# Patient Record
Sex: MALE | Race: WHITE | NOT HISPANIC OR LATINO | Employment: OTHER | URBAN - NONMETROPOLITAN AREA
[De-identification: names, ages, dates, MRNs, and addresses within clinical notes are randomized per-mention and may not be internally consistent; named-entity substitution may affect disease eponyms.]

---

## 2023-10-30 ENCOUNTER — APPOINTMENT (OUTPATIENT)
Dept: CARDIOLOGY | Facility: CLINIC | Age: 78
End: 2023-10-30
Payer: MEDICARE

## 2023-11-10 LAB
NON-UH HIE ALANINE AMINOTRANSFERASE: 22 U/L (ref 7–52)
NON-UH HIE ALBUMIN LEVEL: 4.3 G/DL (ref 3.5–5.7)
NON-UH HIE ALBUMIN/GLOBULIN RATIO: 1.7
NON-UH HIE ALKALINE PHOSPHATASE: 49 U/L (ref 34–104)
NON-UH HIE ANION GAP: 8.4 (ref 6–15)
NON-UH HIE ASPARTATE AMINO TRANSFERASE: 25 U/L (ref 13–39)
NON-UH HIE BASOPHILS # (AUTO): 0.1 10*3/UL (ref 0–0.2)
NON-UH HIE BASOPHILS % (AUTO): 1.2 %
NON-UH HIE BILIRUBIN,TOTAL: 0.8 MG/DL (ref 0.3–1)
NON-UH HIE BLOOD UREA NITROGEN: 22 MG/DL (ref 7–25)
NON-UH HIE CALCIUM: 9.4 MG/DL (ref 8.6–10.3)
NON-UH HIE CARBON DIOXIDE: 31.1 MMOL/L (ref 21–31)
NON-UH HIE CHLORIDE: 106 MMOL/L (ref 98–107)
NON-UH HIE CHOL/HDL RATIO: 2.9
NON-UH HIE CHOLESTEROL: 121 MG/DL (ref 140–200)
NON-UH HIE CREATININE: 1.22 MG/DL (ref 0.7–1.3)
NON-UH HIE EOSINOPHILS # (AUTO): 0.2 10*3/UL (ref 0–0.45)
NON-UH HIE EOSINOPHILS % (AUTO): 3.3 %
NON-UH HIE ESTIMATED GFR: > 60
NON-UH HIE GLOBULIN: 2.5 G/DL
NON-UH HIE GLUCOSE: 87 MG/DL (ref 70–100)
NON-UH HIE HDL CHOLESTEROL: 42 MG/DL (ref 23–92)
NON-UH HIE HEMATOCRIT: 37.4 % (ref 38.8–50)
NON-UH HIE HEMOGLOBIN: 12.6 G/DL (ref 13–17)
NON-UH HIE LDL CHOLESTEROL,CALCULATED: 67 MG/DL (ref 0–100)
NON-UH HIE LYMPHOCYTES # (AUTO): 2.3 10*3/UL (ref 1–4.8)
NON-UH HIE LYMPHOCYTES % (AUTO): 40.1 %
NON-UH HIE MEAN CORPUSCULAR HEMOGLOBIN: 31.5 PG (ref 27.5–35.2)
NON-UH HIE MEAN CORPUSCULAR HGB CONC: 33.6 G/DL (ref 32.5–35.6)
NON-UH HIE MEAN CORPUSCULAR VOLUME: 93.6 FL (ref 83.5–101)
NON-UH HIE MEAN PLATELET VOLUME: 8.4 FL (ref 6.6–10.1)
NON-UH HIE MONOCYTES # (AUTO): 0.5 10*3/UL (ref 0–0.8)
NON-UH HIE MONOCYTES % (AUTO): 8.1 %
NON-UH HIE NEUTROPHILS # (AUTO): 2.7 10*3/UL (ref 1.8–7.7)
NON-UH HIE NEUTROPHILS % (AUTO): 47.3 %
NON-UH HIE NRBC%: 0.2 /100{WBC} (ref 0–0.5)
NON-UH HIE PLATELET COUNT: 140 10*3/UL (ref 150–450)
NON-UH HIE POTASSIUM: 4.5 MMOL/L (ref 3.5–5.1)
NON-UH HIE RED BLOOD COUNT: 3.99 (ref 3.9–5.6)
NON-UH HIE RED CELL DISTRIBUTION WIDTH: 14.8 % (ref 12–14.8)
NON-UH HIE SODIUM: 141 MMOL/L (ref 136–145)
NON-UH HIE TOTAL PROTEIN: 6.8 G/DL (ref 6.4–8.9)
NON-UH HIE TRIGLYCERIDE W/REFLEX: 62 MG/DL (ref 0–149)
NON-UH HIE UNCORRECTED WBC: 5.7 10*3/UL (ref 4.1–10.5)
NON-UH HIE VLDL CHOLESTEROL: 12 MG/DL
NON-UH HIE WHITE BLOOD COUNT: 5.7 10*3/UL (ref 4.1–10.5)

## 2023-11-15 PROBLEM — C67.9 BLADDER CANCER (MULTI): Status: ACTIVE | Noted: 2023-11-15

## 2023-11-15 PROBLEM — Z86.79 ATRIAL FIBRILLATION, CURRENTLY IN SINUS RHYTHM: Status: ACTIVE | Noted: 2023-11-15

## 2023-11-15 PROBLEM — N18.31 STAGE 3A CHRONIC KIDNEY DISEASE (MULTI): Status: ACTIVE | Noted: 2023-11-15

## 2023-11-15 PROBLEM — E78.5 HYPERLIPIDEMIA: Status: ACTIVE | Noted: 2023-11-15

## 2023-11-15 RX ORDER — FERROUS SULFATE 325(65) MG
1 TABLET ORAL 2 TIMES DAILY
COMMUNITY

## 2023-11-15 RX ORDER — GABAPENTIN 100 MG/1
2 CAPSULE ORAL 2 TIMES DAILY
COMMUNITY

## 2023-11-15 RX ORDER — ATORVASTATIN CALCIUM 10 MG/1
1 TABLET, FILM COATED ORAL NIGHTLY
COMMUNITY

## 2023-11-15 RX ORDER — METOPROLOL TARTRATE 25 MG/1
1 TABLET, FILM COATED ORAL 2 TIMES DAILY
COMMUNITY

## 2023-11-15 RX ORDER — VITAMIN B COMPLEX
1 CAPSULE ORAL DAILY
COMMUNITY

## 2023-11-16 ENCOUNTER — OFFICE VISIT (OUTPATIENT)
Dept: CARDIOLOGY | Facility: CLINIC | Age: 78
End: 2023-11-16
Payer: MEDICARE

## 2023-11-16 VITALS
BODY MASS INDEX: 25.34 KG/M2 | DIASTOLIC BLOOD PRESSURE: 64 MMHG | HEIGHT: 71 IN | HEART RATE: 60 BPM | WEIGHT: 181 LBS | SYSTOLIC BLOOD PRESSURE: 124 MMHG

## 2023-11-16 DIAGNOSIS — Z86.79 ATRIAL FIBRILLATION, CURRENTLY IN SINUS RHYTHM: ICD-10-CM

## 2023-11-16 DIAGNOSIS — E78.2 MIXED HYPERLIPIDEMIA: ICD-10-CM

## 2023-11-16 DIAGNOSIS — E66.3 OVERWEIGHT (BMI 25.0-29.9): ICD-10-CM

## 2023-11-16 DIAGNOSIS — N18.31 STAGE 3A CHRONIC KIDNEY DISEASE (MULTI): ICD-10-CM

## 2023-11-16 PROCEDURE — 1036F TOBACCO NON-USER: CPT | Performed by: INTERNAL MEDICINE

## 2023-11-16 PROCEDURE — 1159F MED LIST DOCD IN RCRD: CPT | Performed by: INTERNAL MEDICINE

## 2023-11-16 PROCEDURE — 99213 OFFICE O/P EST LOW 20 MIN: CPT | Performed by: INTERNAL MEDICINE

## 2023-11-16 PROCEDURE — 1160F RVW MEDS BY RX/DR IN RCRD: CPT | Performed by: INTERNAL MEDICINE

## 2023-11-16 RX ORDER — CALCIUM CARBONATE 300MG(750)
500 TABLET,CHEWABLE ORAL DAILY
COMMUNITY

## 2023-11-16 RX ORDER — MV-MIN/FA/VIT K/LUTEIN/ZEAXANT 200MCG-5MG
1 CAPSULE ORAL 2 TIMES DAILY
COMMUNITY

## 2023-11-16 NOTE — PROGRESS NOTES
Patient presents for 1 year follow-up.    Subjective :     Interval review of systems is negative for chest discomfort pressure tightness heaviness palpitations lightheadedness orthopnea paroxysmal nocturnal dyspnea dependent edema or claudication TIA or CVA type symptoms or bleeding diathesis.      History so Far :    1. history of atrial fibrillation currently in sinus rhythm  2. Echocardiogram June 2020 LVEF 60 to 65% trace aortic and mitral regurgitation trace tricuspid regurgitation mild pulmonary hypertension compared to an echo from 2016 RV systolic pressure has improved, left atrial diameter 3.3 cm left atrial volume index 20 mL/mÂ² RV systolic pressure 32 mmHg aortic root 3.2 cm which is upper normal  3. CKD stage IIIa, resolved recent GFR is greater than 60     Objective      Wt Readings from Last 3 Encounters:   11/16/23 82.1 kg (181 lb)   10/24/22 84.8 kg (187 lb)   02/08/22 91.6 kg (202 lb)            Physical Exam:    GENERAL APPEARANCE: in no acute distress.  CHEST: Symmetric and non-tender.  INTEGUMENT: Skin warm and dry  HEENT: No gross abnormalities identified.No pallor or scleral icterus.  NECK: Supple, no JVD, no bruit.   NEURO/PSHCY: Alert and oriented x3; appropriate behavior and responses and responses  LUNGS: Clear to auscultation bilaterally; normal respiratory effort.  HEART: Rate and rhythm regular with no evident murmur; no gallop appreciated.   ABDOMEN: Soft, non tender.  MUSCULOSKELETAL: No gross deformities.  EXTREMITIES: Warm  There is no edema noted.    Meds:  Current Outpatient Medications   Medication Instructions    atorvastatin (Lipitor) 10 mg tablet 1 tablet, oral, Nightly    b complex vitamins (Vitamins B Complex) capsule 1 capsule, oral, Daily    ferrous sulfate, 325 mg ferrous sulfate, tablet 1 tablet, oral, 2 times daily    gabapentin (Neurontin) 100 mg capsule 2 capsules, oral, 2 times daily    magnesium oxide (MAG-OX) 500 mg, Daily    metoprolol tartrate (Lopressor) 25 mg  tablet 1 tablet, oral, 2 times daily    mv-min-FA-vit K-lutein-zeaxant (PreserVision AREDS 2 Plus MV) 200 mcg-15 mcg- 5 mg-1 mg capsule 1 capsule, oral, 2 times daily          No Known Allergies          LABS:      Reviewed laboratory data from November 2023, GFR is now greater than 60 sodium 141 potassium 4.5 hemoglobin 12.6 hematocrit 37.4 lipid profile is excellent total cholesterol 121 HDL 42 LDL 67    Problem List:    Patient Active Problem List    Diagnosis Date Noted    Atrial fibrillation, currently in sinus rhythm 11/15/2023    Bladder cancer (CMS/Formerly [facility] [place]) 11/15/2023    Hyperlipidemia 11/15/2023    Stage 3a chronic kidney disease (CMS/Formerly [facility] [place]) 11/15/2023                 Assessment:    1.  Essential hypertension-blood pressure is at target  2.  Hyperlipidemia-on statin therapy, at target  3.  History of kidney disease stage IIIa and prior history of bladder cancer-GFR is now greater than 60  4.  Patient is refusing to go on an aspirin, has history of atrial fibrillation with no recurrence.    Patient is following a heart healthy lifestyle.      Follow up : 1 year      Kiah Mendez MD

## 2023-11-16 NOTE — PATIENT INSTRUCTIONS
Please bring all medicines, vitamins, and herbal supplements with you when you come to the office.    Prescriptions will not be filled unless you are compliant with your follow up appointments or have a follow up appointment scheduled as per instruction of your physician. Refills should be requested at the time of your visit.    Refuses ASA

## 2024-11-09 LAB
NON-UH HIE ALANINE AMINOTRANSFERASE: 17 U/L (ref 7–52)
NON-UH HIE ALBUMIN LEVEL: 4.1 G/DL (ref 3.5–5.7)
NON-UH HIE ALBUMIN/GLOBULIN RATIO: 1.6
NON-UH HIE ALKALINE PHOSPHATASE: 47 U/L (ref 34–104)
NON-UH HIE ANION GAP: 9.1 (ref 6–15)
NON-UH HIE ASPARTATE AMINO TRANSFERASE: 24 U/L (ref 13–39)
NON-UH HIE BILIRUBIN,TOTAL: 1.3 MG/DL (ref 0.3–1)
NON-UH HIE BLOOD UREA NITROGEN: 29 MG/DL (ref 7–25)
NON-UH HIE CALCIUM: 9 MG/DL (ref 8.6–10.3)
NON-UH HIE CARBON DIOXIDE: 27.8 MMOL/L (ref 21–31)
NON-UH HIE CHLORIDE: 101 MMOL/L (ref 98–107)
NON-UH HIE CHOL/HDL RATIO: 3.6
NON-UH HIE CHOLESTEROL: 126 MG/DL (ref 140–200)
NON-UH HIE CREATININE: 1.48 MG/DL (ref 0.7–1.3)
NON-UH HIE ESTIMATED GFR: 47.83
NON-UH HIE GLOBULIN: 2.6 G/DL
NON-UH HIE GLUCOSE: 94 MG/DL (ref 70–100)
NON-UH HIE HDL CHOLESTEROL: 35 MG/DL (ref 23–92)
NON-UH HIE LDL CHOLESTEROL,CALCULATED: 78 MG/DL (ref 0–100)
NON-UH HIE POTASSIUM: 4.9 MMOL/L (ref 3.5–5.1)
NON-UH HIE SODIUM: 133 MMOL/L (ref 136–145)
NON-UH HIE TOTAL PROTEIN: 6.7 G/DL (ref 6.4–8.9)
NON-UH HIE TRIGLYCERIDE W/REFLEX: 65 MG/DL (ref 0–149)
NON-UH HIE VLDL CHOLESTEROL: 13 MG/DL

## 2024-11-14 ENCOUNTER — APPOINTMENT (OUTPATIENT)
Dept: CARDIOLOGY | Facility: CLINIC | Age: 79
End: 2024-11-14
Payer: MEDICARE

## 2024-11-14 VITALS
WEIGHT: 191 LBS | HEIGHT: 71 IN | BODY MASS INDEX: 26.74 KG/M2 | HEART RATE: 60 BPM | DIASTOLIC BLOOD PRESSURE: 72 MMHG | SYSTOLIC BLOOD PRESSURE: 124 MMHG

## 2024-11-14 DIAGNOSIS — E78.2 MIXED HYPERLIPIDEMIA: ICD-10-CM

## 2024-11-14 DIAGNOSIS — Z87.891 FORMER SMOKER: ICD-10-CM

## 2024-11-14 DIAGNOSIS — E66.3 OVERWEIGHT (BMI 25.0-29.9): ICD-10-CM

## 2024-11-14 DIAGNOSIS — N18.31 STAGE 3A CHRONIC KIDNEY DISEASE (MULTI): ICD-10-CM

## 2024-11-14 DIAGNOSIS — Z86.79 ATRIAL FIBRILLATION, CURRENTLY IN SINUS RHYTHM: ICD-10-CM

## 2024-11-14 PROCEDURE — 1160F RVW MEDS BY RX/DR IN RCRD: CPT | Performed by: INTERNAL MEDICINE

## 2024-11-14 PROCEDURE — 1159F MED LIST DOCD IN RCRD: CPT | Performed by: INTERNAL MEDICINE

## 2024-11-14 PROCEDURE — G2211 COMPLEX E/M VISIT ADD ON: HCPCS | Performed by: INTERNAL MEDICINE

## 2024-11-14 PROCEDURE — 99214 OFFICE O/P EST MOD 30 MIN: CPT | Performed by: INTERNAL MEDICINE

## 2024-11-14 RX ORDER — ATORVASTATIN CALCIUM 10 MG/1
10 TABLET, FILM COATED ORAL NIGHTLY
Qty: 90 TABLET | Refills: 3 | Status: SHIPPED | OUTPATIENT
Start: 2024-11-14 | End: 2025-11-14

## 2024-11-14 RX ORDER — NAPROXEN SODIUM 220 MG/1
81 TABLET, FILM COATED ORAL DAILY
Start: 2024-11-14 | End: 2025-11-14

## 2024-11-14 RX ORDER — METOPROLOL TARTRATE 25 MG/1
25 TABLET, FILM COATED ORAL 2 TIMES DAILY
Qty: 180 TABLET | Refills: 3 | Status: SHIPPED | OUTPATIENT
Start: 2024-11-14 | End: 2025-11-14

## 2024-11-14 NOTE — LETTER
"November 15, 2024     Sameer Dougherty MD  [address]  [facility] [address]    Patient: Luther Yung   YOB: 1945   Date of Visit: 11/14/2024       Dear Dr. Sameer Dougherty MD:    Thank you for referring Luther Yung to me for evaluation. Below are my notes for this consultation.  If you have questions, please do not hesitate to call me. I look forward to following your patient along with you.       Sincerely,     Kiah Mendez MD      CC: No Recipients  ______________________________________________________________________________________    1 year follow-up.    Subjective :   Extremely pleasant 79-year-old with remote history of atrial fibrillation and chronic kidney disease presents for follow-up.  Interval review of systems is negative for chest discomfort pressure tightness heaviness palpitations lightheadedness orthopnea paroxysmal nocturnal dyspnea dependent edema or claudication TIA or CVA type symptoms or bleeding diathesis  Works out regularly.  Very active in [employer].  No falls.  History so Far :  1. history of atrial fibrillation currently in sinus rhythm  2. Echocardiogram June 2020 LVEF 60 to 65% trace aortic and mitral regurgitation trace tricuspid regurgitation mild pulmonary hypertension compared to an echo from 2016 RV systolic pressure has improved, left atrial diameter 3.3 cm left atrial volume index 20 mL/mÂ² RV systolic pressure 32 mmHg aortic root 3.2 cm which is upper normal  3. CKD stage IIIa, resolved recent GFR is greater than 60        4.  Mixed hyperlipidemia, on statin therapy     Objective   Wt Readings from Last 3 Encounters:   11/14/24 86.6 kg (191 lb)   11/16/23 82.1 kg (181 lb)   10/24/22 84.8 kg (187 lb)            Vitals:    11/14/24 0947   BP: 124/72   BP Location: Left arm   Patient Position: Sitting   Pulse: 60   Weight: 86.6 kg (191 lb)   Height: 1.803 m (5' 11\")                Physical Exam:    GENERAL APPEARANCE: in no acute distress.  CHEST: Symmetric " and non-tender.  INTEGUMENT: Skin warm and dry  HEENT: No gross abnormalities identified.No pallor or scleral icterus.  NECK: Supple, no JVD, no bruit.   NEURO/PSHCY: Alert and oriented x3; appropriate behavior and responses and responses  LUNGS: Clear to auscultation bilaterally; normal respiratory effort.  HEART: Rate and rhythm regular with no evident murmur; no gallop appreciated.   ABDOMEN: Soft, non tender.  MUSCULOSKELETAL: No gross deformities.  EXTREMITIES: Warm  There is no edema noted.    Meds:  Current Outpatient Medications   Medication Instructions   • atorvastatin (Lipitor) 10 mg tablet 1 tablet, Nightly   • b complex vitamins (Vitamins B Complex) capsule 1 capsule, Daily   • ferrous sulfate, 325 mg ferrous sulfate, tablet 1 tablet, 2 times daily   • gabapentin (Neurontin) 100 mg capsule 2 capsules, 2 times daily   • magnesium oxide (MAG-OX) 500 mg, Daily   • metoprolol tartrate (Lopressor) 25 mg tablet 1 tablet, 2 times daily   • mv-min-FA-vit K-lutein-zeaxant (PreserVision AREDS 2 Plus MV) 200 mcg-15 mcg- 5 mg-1 mg capsule 1 capsule, 2 times daily          No Known Allergies      LABS:    November 2024-total cholesterol 126 HDL 35 triglycerides 65 normal LDL cholesterol 78 BUN 29 creatinine 1.48 GFR 47.8 sodium 133 potassium 4.9 liver enzymes    Patient Active Problem List    Diagnosis Date Noted   • Former smoker 11/14/2024   • Overweight (BMI 25.0-29.9) 11/16/2023   • Atrial fibrillation, currently in sinus rhythm 11/15/2023   • Bladder cancer (Multi) 11/15/2023   • Hyperlipidemia 11/15/2023   • Stage 3a chronic kidney disease (Multi) 11/15/2023                 Assessment:    1. Atrial fibrillation, currently in sinus rhythm  Follow Up In Cardiology      2. Mixed hyperlipidemia  Follow Up In Cardiology      3. Stage 3a chronic kidney disease (Multi)  Follow Up In Cardiology      4. Overweight (BMI 25.0-29.9)  Follow Up In Cardiology      5. Former smoker        Clinical decision making:  Doing  well clinically, will continue current medications, he is not on anticoagulation however he has not had any atrial fibrillation in the past several years, recommend that he take enteric-coated aspirin 81 mg daily .he was reluctant initially, because he donates blood.  I told him that he can stay off the aspirin for the week prior to blood donation.     Follow up : 1 year        Provider Attestation - Scribe documentation    All medical record entries made by the Scribe were at my direction and personally dictated by me. I have reviewed the chart and agree that the record accurately reflects my personal performance of the history, physical exam, discussion and plan.   Scribe Attestation  By signing my name below, I, Adamaris CHANDLER LPN  , [place]   attest that this documentation has been prepared under the direction and in the presence of Kiah Mendez MD.

## 2024-11-14 NOTE — PROGRESS NOTES
"1 year follow-up.    Subjective :   Extremely pleasant 79-year-old with remote history of atrial fibrillation and chronic kidney disease presents for follow-up.  Interval review of systems is negative for chest discomfort pressure tightness heaviness palpitations lightheadedness orthopnea paroxysmal nocturnal dyspnea dependent edema or claudication TIA or CVA type symptoms or bleeding diathesis  Works out regularly.  Very active in [employer].  No falls.  History so Far :  1. history of atrial fibrillation currently in sinus rhythm  2. Echocardiogram June 2020 LVEF 60 to 65% trace aortic and mitral regurgitation trace tricuspid regurgitation mild pulmonary hypertension compared to an echo from 2016 RV systolic pressure has improved, left atrial diameter 3.3 cm left atrial volume index 20 mL/mÂ² RV systolic pressure 32 mmHg aortic root 3.2 cm which is upper normal  3. CKD stage IIIa, resolved recent GFR is greater than 60        4.  Mixed hyperlipidemia, on statin therapy     Objective   Wt Readings from Last 3 Encounters:   11/14/24 86.6 kg (191 lb)   11/16/23 82.1 kg (181 lb)   10/24/22 84.8 kg (187 lb)            Vitals:    11/14/24 0947   BP: 124/72   BP Location: Left arm   Patient Position: Sitting   Pulse: 60   Weight: 86.6 kg (191 lb)   Height: 1.803 m (5' 11\")                Physical Exam:    GENERAL APPEARANCE: in no acute distress.  CHEST: Symmetric and non-tender.  INTEGUMENT: Skin warm and dry  HEENT: No gross abnormalities identified.No pallor or scleral icterus.  NECK: Supple, no JVD, no bruit.   NEURO/PSHCY: Alert and oriented x3; appropriate behavior and responses and responses  LUNGS: Clear to auscultation bilaterally; normal respiratory effort.  HEART: Rate and rhythm regular with no evident murmur; no gallop appreciated.   ABDOMEN: Soft, non tender.  MUSCULOSKELETAL: No gross deformities.  EXTREMITIES: Warm  There is no edema noted.    Meds:  Current Outpatient Medications   Medication Instructions    " atorvastatin (Lipitor) 10 mg tablet 1 tablet, Nightly    b complex vitamins (Vitamins B Complex) capsule 1 capsule, Daily    ferrous sulfate, 325 mg ferrous sulfate, tablet 1 tablet, 2 times daily    gabapentin (Neurontin) 100 mg capsule 2 capsules, 2 times daily    magnesium oxide (MAG-OX) 500 mg, Daily    metoprolol tartrate (Lopressor) 25 mg tablet 1 tablet, 2 times daily    mv-min-FA-vit K-lutein-zeaxant (PreserVision AREDS 2 Plus MV) 200 mcg-15 mcg- 5 mg-1 mg capsule 1 capsule, 2 times daily          No Known Allergies      LABS:    November 2024-total cholesterol 126 HDL 35 triglycerides 65 normal LDL cholesterol 78 BUN 29 creatinine 1.48 GFR 47.8 sodium 133 potassium 4.9 liver enzymes    Patient Active Problem List    Diagnosis Date Noted    Former smoker 11/14/2024    Overweight (BMI 25.0-29.9) 11/16/2023    Atrial fibrillation, currently in sinus rhythm 11/15/2023    Bladder cancer (Multi) 11/15/2023    Hyperlipidemia 11/15/2023    Stage 3a chronic kidney disease (Multi) 11/15/2023                 Assessment:    1. Atrial fibrillation, currently in sinus rhythm  Follow Up In Cardiology      2. Mixed hyperlipidemia  Follow Up In Cardiology      3. Stage 3a chronic kidney disease (Multi)  Follow Up In Cardiology      4. Overweight (BMI 25.0-29.9)  Follow Up In Cardiology      5. Former smoker        Clinical decision making:  Doing well clinically, will continue current medications, he is not on anticoagulation however he has not had any atrial fibrillation in the past several years, recommend that he take enteric-coated aspirin 81 mg daily .he was reluctant initially, because he donates blood.  I told him that he can stay off the aspirin for the week prior to blood donation.     Follow up : 1 year        Provider Attestation - Scribe documentation    All medical record entries made by the Scribe were at my direction and personally dictated by me. I have reviewed the chart and agree that the record accurately  reflects my personal performance of the history, physical exam, discussion and plan.   Scribe Attestation  By signing my name below, I, Adamaris CHANDLER LPN  , Scribe   attest that this documentation has been prepared under the direction and in the presence of Kiah Mendez MD.

## 2024-11-14 NOTE — PATIENT INSTRUCTIONS
Please bring all medicines, vitamins, and herbal supplements with you when you come to the office.    Prescriptions will not be filled unless you are compliant with your follow up appointments or have a follow up appointment scheduled as per instruction of your physician. Refills should be requested at the time of your visit.   BMI was above normal measurement. Current weight: 86.6 kg (191 lb)  Weight change since last visit (-) denotes wt loss 10 lbs   Weight loss needed to achieve BMI 25: 12.1 Lbs  Weight loss needed to achieve BMI 30: -23.6 Lbs  Provided instructions on dietary changes.

## 2025-11-17 ENCOUNTER — APPOINTMENT (OUTPATIENT)
Dept: CARDIOLOGY | Facility: CLINIC | Age: 80
End: 2025-11-17
Payer: MEDICARE